# Patient Record
Sex: FEMALE | URBAN - METROPOLITAN AREA
[De-identification: names, ages, dates, MRNs, and addresses within clinical notes are randomized per-mention and may not be internally consistent; named-entity substitution may affect disease eponyms.]

---

## 2023-06-02 ENCOUNTER — OFFICE VISIT (OUTPATIENT)
Dept: URBAN - METROPOLITAN AREA CLINIC 17 | Facility: CLINIC | Age: 63
End: 2023-06-02
Payer: COMMERCIAL

## 2023-06-02 DIAGNOSIS — H00.15 CHALAZION LEFT LOWER EYELID: Primary | ICD-10-CM

## 2023-06-02 PROCEDURE — 67800 REMOVE EYELID LESION: CPT | Performed by: OPHTHALMOLOGY

## 2023-06-02 RX ORDER — ERYTHROMYCIN 5 MG/G
OINTMENT OPHTHALMIC
Qty: 1 | Refills: 0 | Status: ACTIVE
Start: 2023-06-02

## 2023-06-02 ASSESSMENT — INTRAOCULAR PRESSURE
OD: 14
OS: 13

## 2023-06-02 NOTE — IMPRESSION/PLAN
Impression: Chalazion left lower eyelid: H00.15. Plan: After informed consent obtained, lid injected with 2% lidocaine, then lid everted with a clamp. Tarsal plate incised with #65 blade and meibum evacuated with a curette. Clamp removed and hemostasis achieved. Ointment placed in eye. Pt tolerated procedure well. Erythromycin oint bid for a week (erx'd to pharmacy), warm compresses bid.